# Patient Record
Sex: FEMALE | Race: WHITE | URBAN - METROPOLITAN AREA
[De-identification: names, ages, dates, MRNs, and addresses within clinical notes are randomized per-mention and may not be internally consistent; named-entity substitution may affect disease eponyms.]

---

## 2018-11-06 ENCOUNTER — OFFICE VISIT (OUTPATIENT)
Dept: URBAN - METROPOLITAN AREA CLINIC 76 | Facility: CLINIC | Age: 48
End: 2018-11-06
Payer: COMMERCIAL

## 2018-11-06 DIAGNOSIS — H26.491 OTHER SECONDARY CATARACT, RIGHT EYE: ICD-10-CM

## 2018-11-06 DIAGNOSIS — H05.241 CONSTANT EXOPHTHALMOS, RIGHT EYE: Primary | ICD-10-CM

## 2018-11-06 DIAGNOSIS — H25.12 AGE-RELATED NUCLEAR CATARACT, LEFT EYE: ICD-10-CM

## 2018-11-06 DIAGNOSIS — E11.9 TYPE 2 DIABETES MELLITUS W/O COMPLICATION: ICD-10-CM

## 2018-11-06 PROCEDURE — 92004 COMPRE OPH EXAM NEW PT 1/>: CPT | Performed by: OPTOMETRIST

## 2018-11-06 ASSESSMENT — INTRAOCULAR PRESSURE
OD: 17
OS: 18

## 2018-11-06 NOTE — IMPRESSION/PLAN
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 OD. h/x of meningioma OD.  brain surgery 2007. Exo: Base 90. OD: 28mm OS: 16mm. Sudden new change with eye pain x 1 week. Plan: Discussed condition in great detail. Pt has annual MRI & follow up scheduled with Neuro Ophthalmologist at [facility] later this month. Need to obtain old medical records from brain surgery. Discussed condition with Dr. Jenni Feliciano. Dr. Con Bain recommends pt contact [facility] immediately, DO NOT wait, emphasized urgency. Pt understands, she will call neuro ophthalmologist today. Pt to take today's notes with her to [facility]. **in the event [address] needs to order MRI, would recommend w/o contrast due to h/x of DM.

## 2018-11-06 NOTE — IMPRESSION/PLAN
Impression: Type 2 diabetes mellitus w/o complication: P64.1. OU. Plan: Discussed diagnosis in detail with patient. No treatment is required at this time. Emphasized blood sugar control. Call if [address] worsens. Will continue to observe condition and or symptoms. Recommend yearly exams.

## 2018-11-06 NOTE — IMPRESSION/PLAN
Impression: Age-related nuclear cataract, left eye: H25.12. OS. Plan: Continue to monitor without treatment at this time.

## 2018-11-06 NOTE — IMPRESSION/PLAN
Impression: Other secondary cataract, right eye: H26.491. OD. Plan: Continue to observe at this time.

## 2021-12-15 ENCOUNTER — OFFICE VISIT (OUTPATIENT)
Dept: URBAN - METROPOLITAN AREA CLINIC 10 | Facility: CLINIC | Age: 51
End: 2021-12-15
Payer: COMMERCIAL

## 2021-12-15 DIAGNOSIS — Z96.1 PRESENCE OF PSEUDOPHAKIA: ICD-10-CM

## 2021-12-15 DIAGNOSIS — D32.9 MENINGIOMA: ICD-10-CM

## 2021-12-15 DIAGNOSIS — H16.402 UNSPECIFIED CORNEAL NEOVASCULARIZATION, LEFT EYE: ICD-10-CM

## 2021-12-15 PROCEDURE — 76514 ECHO EXAM OF EYE THICKNESS: CPT | Performed by: OPHTHALMOLOGY

## 2021-12-15 PROCEDURE — 92025 CPTRIZED CORNEAL TOPOGRAPHY: CPT | Performed by: OPHTHALMOLOGY

## 2021-12-15 PROCEDURE — 92004 COMPRE OPH EXAM NEW PT 1/>: CPT | Performed by: OPHTHALMOLOGY

## 2021-12-15 RX ORDER — ACYCLOVIR 400 MG/1
400 MG TABLET ORAL
Qty: 70 | Refills: 0 | Status: INACTIVE
Start: 2021-12-15 | End: 2022-02-15

## 2021-12-15 RX ORDER — OFLOXACIN 3 MG/ML
0.3 % SOLUTION/ DROPS OPHTHALMIC
Qty: 5 | Refills: 1 | Status: ACTIVE
Start: 2021-12-15

## 2021-12-15 RX ORDER — PREDNISOLONE ACETATE 10 MG/ML
1 % SUSPENSION/ DROPS OPHTHALMIC
Qty: 5 | Refills: 3 | Status: ACTIVE
Start: 2021-12-15

## 2021-12-15 ASSESSMENT — INTRAOCULAR PRESSURE
OD: 10
OS: 14

## 2021-12-15 NOTE — IMPRESSION/PLAN
Impression: Central corneal opacity, left eye: H17.12. Plan: H/o CVA with loss of vision, was seen by ophthalmologist and was treated for K ulcer. Significant scar remains. I would recommend PKP surgery. Visual rehabilitation can take a year or longer. Risks of Keratoplasty include similar risks to any intraocular surgery such as bleeding, cataract, and infection. Risks include rejection, need for additional surgery, need for glasses after, and the risks from the medications used. Steroids should not be stopped without instruction by a doctor. Information packet given. Questions answered. Pt understands that visual potential is unknown, no guarantees of vision improvement. Pt would like to proceed with PKP OS. Will tx prophylactically for herpetic etiologies given the recurrent nature of her corneal ulceration:  Start ACV 400mg 5x/d 1 week before surgery and 1 week after then decrease to BID.

## 2022-01-19 ENCOUNTER — ADULT PHYSICAL (OUTPATIENT)
Dept: URBAN - METROPOLITAN AREA CLINIC 44 | Facility: CLINIC | Age: 52
End: 2022-01-19
Payer: COMMERCIAL

## 2022-01-19 DIAGNOSIS — Z01.818 ENCOUNTER FOR OTHER PREPROCEDURAL EXAMINATION: Primary | ICD-10-CM

## 2022-01-19 DIAGNOSIS — H17.12 CENTRAL CORNEAL OPACITY, LEFT EYE: ICD-10-CM

## 2022-01-19 PROCEDURE — 99203 OFFICE O/P NEW LOW 30 MIN: CPT | Performed by: PHYSICIAN ASSISTANT

## 2022-02-08 ENCOUNTER — SURGERY (OUTPATIENT)
Dept: URBAN - METROPOLITAN AREA SURGERY 5 | Facility: SURGERY | Age: 52
End: 2022-02-08
Payer: COMMERCIAL

## 2022-02-08 DIAGNOSIS — H21.512 ANTERIOR SYNECHIAE (IRIS), LEFT EYE: Primary | ICD-10-CM

## 2022-02-08 PROCEDURE — 65755 CORNEAL TRANSPLANT: CPT | Performed by: OPHTHALMOLOGY

## 2022-02-08 RX ORDER — ACETAMINOPHEN AND CODEINE PHOSPHATE 300; 30 MG/1; MG/1
TABLET ORAL
Qty: 10 | Refills: 0 | Status: ACTIVE
Start: 2022-02-08

## 2022-02-09 ENCOUNTER — POST-OPERATIVE VISIT (OUTPATIENT)
Dept: URBAN - METROPOLITAN AREA CLINIC 44 | Facility: CLINIC | Age: 52
End: 2022-02-09

## 2022-02-09 DIAGNOSIS — Z48.810 ENCOUNTER FOR SURGICAL AFTERCARE FOLLOWING SURGERY ON A SENSE ORGAN: Primary | ICD-10-CM

## 2022-02-09 PROCEDURE — 99024 POSTOP FOLLOW-UP VISIT: CPT | Performed by: OPTOMETRIST

## 2022-02-09 ASSESSMENT — INTRAOCULAR PRESSURE: OS: 24

## 2022-02-09 NOTE — IMPRESSION/PLAN
Impression: S/P Penetrating keratoplasty; Anterior synechiolysis OS - 1 Day. Encounter for surgical aftercare following surgery on a sense organ  Z48.810.  Plan: PLAN: RTC as dario Daley

## 2022-02-15 ENCOUNTER — POST-OPERATIVE VISIT (OUTPATIENT)
Dept: URBAN - METROPOLITAN AREA CLINIC 44 | Facility: CLINIC | Age: 52
End: 2022-02-15

## 2022-02-15 PROCEDURE — 99024 POSTOP FOLLOW-UP VISIT: CPT | Performed by: OPHTHALMOLOGY

## 2022-02-15 RX ORDER — ERYTHROMYCIN 5 MG/G
OINTMENT OPHTHALMIC
Qty: 5 | Refills: 3 | Status: ACTIVE
Start: 2022-02-15

## 2022-02-15 RX ORDER — ACYCLOVIR 400 MG/1
400 MG TABLET ORAL
Qty: 150 | Refills: 3 | Status: ACTIVE
Start: 2022-02-15

## 2022-02-15 ASSESSMENT — INTRAOCULAR PRESSURE
OD: 13
OS: 11

## 2022-02-15 NOTE — IMPRESSION/PLAN
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 Central ED worrisome for HSVK. Resume ACXV 400mg 5x/d. Cont pred and oflox QID Start erythro shreya TID. 
If no improvement will add Ani Marino

## 2022-02-21 ENCOUNTER — POST-OPERATIVE VISIT (OUTPATIENT)
Dept: URBAN - METROPOLITAN AREA CLINIC 44 | Facility: CLINIC | Age: 52
End: 2022-02-21
Payer: COMMERCIAL

## 2022-02-21 DIAGNOSIS — Z94.7 CORNEAL TRANSPLANT STATUS: Primary | ICD-10-CM

## 2022-02-21 PROCEDURE — 99024 POSTOP FOLLOW-UP VISIT: CPT | Performed by: OPHTHALMOLOGY

## 2022-02-21 ASSESSMENT — INTRAOCULAR PRESSURE
OS: 16
OD: 15

## 2022-02-21 NOTE — IMPRESSION/PLAN
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 Central ED nearly resolved. Cont ACV 400mg 5x/d. Increase pred and oflox to 6x/d Cont erythro shreya TID. 
If no improvement will add Tanya Braden

## 2022-03-02 ENCOUNTER — OFFICE VISIT (OUTPATIENT)
Dept: URBAN - METROPOLITAN AREA CLINIC 44 | Facility: CLINIC | Age: 52
End: 2022-03-02
Payer: COMMERCIAL

## 2022-03-02 PROCEDURE — 99024 POSTOP FOLLOW-UP VISIT: CPT | Performed by: OPHTHALMOLOGY

## 2022-03-02 ASSESSMENT — INTRAOCULAR PRESSURE
OD: 13
OS: 7

## 2022-03-02 NOTE — IMPRESSION/PLAN
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 ED resolved but still with epitheliopathy Cont ACV 400mg 5x/d. Decrease pred to 5x/d x 1 week then decrease to QID. d/c oflox Cont erythro shreya TID.